# Patient Record
Sex: FEMALE | Race: WHITE | NOT HISPANIC OR LATINO | Employment: FULL TIME | URBAN - METROPOLITAN AREA
[De-identification: names, ages, dates, MRNs, and addresses within clinical notes are randomized per-mention and may not be internally consistent; named-entity substitution may affect disease eponyms.]

---

## 2017-01-04 ENCOUNTER — ALLSCRIPTS OFFICE VISIT (OUTPATIENT)
Dept: OTHER | Facility: OTHER | Age: 47
End: 2017-01-04

## 2017-01-04 DIAGNOSIS — R21 RASH AND OTHER NONSPECIFIC SKIN ERUPTION: ICD-10-CM

## 2017-01-04 DIAGNOSIS — R53.83 OTHER FATIGUE: ICD-10-CM

## 2017-01-06 ENCOUNTER — LAB CONVERSION - ENCOUNTER (OUTPATIENT)
Dept: OTHER | Facility: OTHER | Age: 47
End: 2017-01-06

## 2017-01-06 LAB — LYME IGG/IGM AB (HISTORICAL): NORMAL INDEX

## 2018-01-14 VITALS
DIASTOLIC BLOOD PRESSURE: 78 MMHG | RESPIRATION RATE: 16 BRPM | HEART RATE: 70 BPM | HEIGHT: 64 IN | BODY MASS INDEX: 22.09 KG/M2 | SYSTOLIC BLOOD PRESSURE: 124 MMHG | WEIGHT: 129.38 LBS

## 2018-03-22 ENCOUNTER — OFFICE VISIT (OUTPATIENT)
Dept: FAMILY MEDICINE CLINIC | Facility: MEDICAL CENTER | Age: 48
End: 2018-03-22
Payer: COMMERCIAL

## 2018-03-22 VITALS
BODY MASS INDEX: 22.31 KG/M2 | WEIGHT: 130 LBS | DIASTOLIC BLOOD PRESSURE: 70 MMHG | HEART RATE: 80 BPM | RESPIRATION RATE: 14 BRPM | SYSTOLIC BLOOD PRESSURE: 110 MMHG

## 2018-03-22 DIAGNOSIS — R10.11 RIGHT UPPER QUADRANT ABDOMINAL PAIN: ICD-10-CM

## 2018-03-22 DIAGNOSIS — K43.9 HERNIA OF ABDOMINAL WALL: ICD-10-CM

## 2018-03-22 DIAGNOSIS — E78.49 OTHER HYPERLIPIDEMIA: ICD-10-CM

## 2018-03-22 DIAGNOSIS — R10.13 EPIGASTRIC PAIN: Primary | ICD-10-CM

## 2018-03-22 PROCEDURE — 99214 OFFICE O/P EST MOD 30 MIN: CPT | Performed by: FAMILY MEDICINE

## 2018-03-22 RX ORDER — BIOTIN 2500 MCG
CAPSULE ORAL
COMMUNITY

## 2018-03-22 RX ORDER — ACETAMINOPHEN 160 MG
TABLET,DISINTEGRATING ORAL
COMMUNITY

## 2018-03-22 RX ORDER — MULTIVITAMIN
TABLET ORAL
COMMUNITY

## 2018-03-22 RX ORDER — OMEGA-3-ACID ETHYL ESTERS 1 G/1
2 CAPSULE, LIQUID FILLED ORAL DAILY
COMMUNITY

## 2018-03-23 NOTE — PROGRESS NOTES
Callie Brothers has been under a lot of stress with marital problems  Considering divorce  Currently living with children and   Waiting for children to finish school  She had a stomach virus early January  No appetite, epigastric pain, nausea and vomiting  Some diarrhea  Recovered  Two weeks ago she noticed a lump in her inner right thigh  Had been working  out the day before  Red, warm, painful  A week later she got sharpt pain in her epigastric area which radiated to her right shoulder  Felt like an electric pain  She also has epigastric burnign pain  Occurred several time s Now she has  stomach burning   She drinks coffee and eats chocolate  Drinks about 3 cups of coffee daily  Drinks on an empty stomach  No much alcohol  No chest or neck radiation  Gets occ burning in her throat  Occ wakes form sleep  Ranitidine helps  She started ranitidine twice daily a few days ago and seems to be helping   She drinks decaff green tea  She has interstitial cystitis  Acting up lately  She notes that she has been doing a lot of sit-ups lately  O: /70   Pulse 80   Resp 14   Wt 59 kg (130 lb)   BMI 22 31 kg/m²   Neck no adenopathy thyromegaly  Chest clear  Cardiac regular rate without murmur  Abdomen slight epigastric tenderness  Some slight to moderate right upper quadrant tenderness  Nondistended  No masses or hepatosplenomegaly  She does have a umbilical hernia  Approximately half-dollar sized  In her right inguinal crease pea-sized movable mass nontender  No erythema  Assessment  1  Abdominal pain-most likely reflux esophagitis however need to consider gallbladder disease in light of the right upper quadrant pain  It is also possible that the exercises aggravated her abdominal hernia  Since she has already started ranitidine will have her continue this twice daily  Also advised to avoid caffeine and chocolate  Should elevate the head of the bed  If no better should be referred for EGD    2  Umbilical hernia-should see surgeon  3  History of hyperlipidemia-due for BW   4  Stress-discussed  Encouraged therapist  5  Status post ingrown hair follicle-resolving    Plan  Check abdominal ultrasound  Blood work  Referral to surgery  Otherwise as above  Recheck 4 weeks

## 2018-03-24 ENCOUNTER — APPOINTMENT (OUTPATIENT)
Dept: LAB | Facility: CLINIC | Age: 48
End: 2018-03-24
Payer: COMMERCIAL

## 2018-03-24 DIAGNOSIS — R10.11 RIGHT UPPER QUADRANT ABDOMINAL PAIN: ICD-10-CM

## 2018-03-24 DIAGNOSIS — R10.13 EPIGASTRIC PAIN: ICD-10-CM

## 2018-03-24 DIAGNOSIS — E78.49 OTHER HYPERLIPIDEMIA: ICD-10-CM

## 2018-03-24 LAB
25(OH)D3 SERPL-MCNC: 64.7 NG/ML (ref 30–100)
ALBUMIN SERPL BCP-MCNC: 4.1 G/DL (ref 3.5–5)
ALP SERPL-CCNC: 50 U/L (ref 46–116)
ALT SERPL W P-5'-P-CCNC: 25 U/L (ref 12–78)
ANION GAP SERPL CALCULATED.3IONS-SCNC: 5 MMOL/L (ref 4–13)
AST SERPL W P-5'-P-CCNC: 15 U/L (ref 5–45)
BILIRUB SERPL-MCNC: 0.86 MG/DL (ref 0.2–1)
BUN SERPL-MCNC: 17 MG/DL (ref 5–25)
CALCIUM SERPL-MCNC: 9.5 MG/DL (ref 8.3–10.1)
CHLORIDE SERPL-SCNC: 106 MMOL/L (ref 100–108)
CHOLEST SERPL-MCNC: 187 MG/DL (ref 50–200)
CO2 SERPL-SCNC: 30 MMOL/L (ref 21–32)
CREAT SERPL-MCNC: 0.78 MG/DL (ref 0.6–1.3)
ERYTHROCYTE [DISTWIDTH] IN BLOOD BY AUTOMATED COUNT: 13.4 % (ref 11.6–15.1)
GFR SERPL CREATININE-BSD FRML MDRD: 90 ML/MIN/1.73SQ M
GLUCOSE P FAST SERPL-MCNC: 81 MG/DL (ref 65–99)
HCT VFR BLD AUTO: 43.2 % (ref 34.8–46.1)
HDLC SERPL-MCNC: 69 MG/DL (ref 40–60)
HGB BLD-MCNC: 14.2 G/DL (ref 11.5–15.4)
LDLC SERPL CALC-MCNC: 108 MG/DL (ref 0–100)
MCH RBC QN AUTO: 31.8 PG (ref 26.8–34.3)
MCHC RBC AUTO-ENTMCNC: 32.9 G/DL (ref 31.4–37.4)
MCV RBC AUTO: 97 FL (ref 82–98)
PLATELET # BLD AUTO: 238 THOUSANDS/UL (ref 149–390)
PMV BLD AUTO: 11.2 FL (ref 8.9–12.7)
POTASSIUM SERPL-SCNC: 5.2 MMOL/L (ref 3.5–5.3)
PROT SERPL-MCNC: 7.8 G/DL (ref 6.4–8.2)
RBC # BLD AUTO: 4.47 MILLION/UL (ref 3.81–5.12)
SODIUM SERPL-SCNC: 141 MMOL/L (ref 136–145)
TRIGL SERPL-MCNC: 52 MG/DL
TSH SERPL DL<=0.05 MIU/L-ACNC: 2 UIU/ML (ref 0.36–3.74)
WBC # BLD AUTO: 5.03 THOUSAND/UL (ref 4.31–10.16)

## 2018-03-24 PROCEDURE — 80061 LIPID PANEL: CPT

## 2018-03-24 PROCEDURE — 36415 COLL VENOUS BLD VENIPUNCTURE: CPT

## 2018-03-24 PROCEDURE — 82306 VITAMIN D 25 HYDROXY: CPT

## 2018-03-24 PROCEDURE — 80053 COMPREHEN METABOLIC PANEL: CPT

## 2018-03-24 PROCEDURE — 84443 ASSAY THYROID STIM HORMONE: CPT

## 2018-03-24 PROCEDURE — 85027 COMPLETE CBC AUTOMATED: CPT

## 2018-03-28 ENCOUNTER — HOSPITAL ENCOUNTER (OUTPATIENT)
Dept: ULTRASOUND IMAGING | Facility: HOSPITAL | Age: 48
Discharge: HOME | End: 2018-03-28
Payer: COMMERCIAL

## 2018-03-28 DIAGNOSIS — K43.9 HERNIA OF ABDOMINAL WALL: ICD-10-CM

## 2018-03-28 DIAGNOSIS — R10.11 RIGHT UPPER QUADRANT ABDOMINAL PAIN: ICD-10-CM

## 2018-03-28 DIAGNOSIS — R10.13 EPIGASTRIC PAIN: ICD-10-CM

## 2018-03-28 PROCEDURE — 76700 US EXAM ABDOM COMPLETE: CPT

## 2018-04-09 ENCOUNTER — OFFICE VISIT (OUTPATIENT)
Dept: SURGERY | Facility: CLINIC | Age: 48
End: 2018-04-09
Payer: COMMERCIAL

## 2018-04-09 VITALS
WEIGHT: 126.02 LBS | HEIGHT: 64 IN | HEART RATE: 82 BPM | DIASTOLIC BLOOD PRESSURE: 68 MMHG | BODY MASS INDEX: 21.51 KG/M2 | SYSTOLIC BLOOD PRESSURE: 108 MMHG | TEMPERATURE: 98.7 F | RESPIRATION RATE: 18 BRPM

## 2018-04-09 DIAGNOSIS — K42.9 UMBILICAL HERNIA WITHOUT OBSTRUCTION AND WITHOUT GANGRENE: Primary | ICD-10-CM

## 2018-04-09 DIAGNOSIS — K43.9 HERNIA OF ABDOMINAL WALL: ICD-10-CM

## 2018-04-09 PROBLEM — K21.9 GASTROESOPHAGEAL REFLUX DISEASE WITHOUT ESOPHAGITIS: Status: ACTIVE | Noted: 2018-04-09

## 2018-04-09 PROCEDURE — 99204 OFFICE O/P NEW MOD 45 MIN: CPT | Performed by: SURGERY

## 2018-04-09 NOTE — PROGRESS NOTES
GENERAL SURGERY HISTORY AND PHYSICAL     Leana Fitzpatrick 49 y o  female MRN: [id number]  Unit/Bed#:  Encounter: [id number]      Assessment/Plan   1  Hernia of abdominal wall  Ambulatory referral to General Surgery     Patient with history consistent with reducible umbilical hernia  Patient had reduced hernia on her own prior to my exam so I was not able to fully evaluate the area of of the defect or its contents  Given the patient's reported ease of reduction and lack of obstructive symptoms I do not feel hernia requires emergent repair  Given its symptomatic nature would recommend patient undergo repair on an elective basis  Discussed with her that I would recommend a mesh repair given her level of activity and young age  Patient has a very active schedule the next several months and is not able to go forward with the surgery in the immediate future  Discussed with the patient's symptoms of obstruction to be aware of including on reducible mass persistent pain  Nausea, vomiting, distention and obstipation  Should she have such symptoms should present for immediate evaluation  Will have patient follow up in the future when her schedule is more accommodating to a surgical procedure  Patient may also follow up as needed for any further concerns  Chief Complaint umbilical pain bulging    HPI: Leana Fitzpatrick is a 49y o  year old female who presents with umbilical pain and bulging  Patient has had bulge at this location since her 1st pregnancy I 18 years ago  Also exacerbated after 2nd pregnancy  States she will have a palpable mass that she is easily able to reduce  Becomes more prominent painful throughout the day  Has been more tender recently due to increased abdominal activity  Patient also has issues with reflux but has been improving on ranatadine  Denies any obstructive symptoms, no nausea, vomiting, distension, or obstipation  Again always able to easily reduce the mass    Patient has no previous surgical history  Patient denies any chest pain, shortness of breath, or exertional dyspnea/angina  Able to climb a flight of stairs with no difficulty  No other acute issues        ROS:  12 Point ROS reviewed and negative except for:  Reflux, umbilical mass tenderness    Historical Information   No past medical history on file  Past Surgical History:   Procedure Laterality Date    COLONOSCOPY      approx 2011 (fiberoptic)     Social History   History   Alcohol Use    Yes     Comment: social     History   Drug use: Unknown     History   Smoking Status    Never Smoker   Smokeless Tobacco    Never Used     Family History: no pertinent family history  Allergies   Allergen Reactions    Nitrofurantoin     Sulfa Antibiotics      Meds/Allergies   all current active meds have been reviewed      Objective   Vitals: Blood pressure 108/68, pulse 82, temperature 98 7 °F (37 1 °C), temperature source Oral, resp  rate 18, height 5' 4" (1 626 m), weight 57 2 kg (126 lb 0 3 oz), not currently breastfeeding  ,Body mass index is 21 63 kg/m²  Physical Exam:    General appearance: Awake alert oriented no acute distress  HEENT: Sclera clear, anicterus, no discharge  Neck: Trachea is midline, no adenopathy  Lungs: No respiratory distress, clear to auscultation bilaterally  Heart[de-identified] RRR  Abdomen: soft, nondistended, minimal tender palpation supraumbilically  Patient had just reduced hernia prior to my evaluation I do not appreciate a discrete mass or a defect I can palpate  No evidence of diastasis on flexion of the trunk    Extremities: No edema, nontender  Skin:No rashes or lesions  Neurologic: No weakness or loss of sensation  Psych: Affect appropriate    Lonnie Desir MD  4/9/2018

## 2018-05-24 ENCOUNTER — OFFICE VISIT (OUTPATIENT)
Dept: FAMILY MEDICINE CLINIC | Facility: MEDICAL CENTER | Age: 48
End: 2018-05-24
Payer: COMMERCIAL

## 2018-05-24 VITALS
HEIGHT: 64 IN | DIASTOLIC BLOOD PRESSURE: 70 MMHG | RESPIRATION RATE: 14 BRPM | BODY MASS INDEX: 21.85 KG/M2 | HEART RATE: 80 BPM | WEIGHT: 128 LBS | SYSTOLIC BLOOD PRESSURE: 112 MMHG

## 2018-05-24 DIAGNOSIS — K76.9 LESION OF RIGHT LOBE OF LIVER: Primary | ICD-10-CM

## 2018-05-24 DIAGNOSIS — K21.9 GASTROESOPHAGEAL REFLUX DISEASE WITHOUT ESOPHAGITIS: ICD-10-CM

## 2018-05-24 DIAGNOSIS — K42.9 UMBILICAL HERNIA WITHOUT OBSTRUCTION AND WITHOUT GANGRENE: ICD-10-CM

## 2018-05-24 PROCEDURE — 99213 OFFICE O/P EST LOW 20 MIN: CPT | Performed by: FAMILY MEDICINE

## 2018-05-24 PROCEDURE — 3008F BODY MASS INDEX DOCD: CPT | Performed by: FAMILY MEDICINE

## 2018-05-24 NOTE — PROGRESS NOTES
Hong Silveira is here for f/u  She says her epigastric pain has resolved  Taking 75 mg Zantac  twice daily  She is feeling much better   Eating sauce, etc without symptoms  She saw surgeon for her umbilical hernia  See consult Dr Houston Akira  Will be having elective surgery  She is taking Vit D deficiency 6207-7149 units daily  Stress is better  Getting along with   Enjoys her job  Has 2 teen age sons; Yaay Perry  O: /70   Pulse 80   Resp 14   Ht 5' 4" (1 626 m)   Wt 58 1 kg (128 lb)   BMI 21 97 kg/m²    Abd US  Normal except 7  mm probable hemangioma   CBC CMP, LP TSH Vit D normal    Assessment  1  Esophageal reflux-much improved with continued use of Zantac  She will try to taper and discontinue and use only as needed  2  Umbilical hernia-will be going for elective surgery  3  Liver lesion-probable hemangioma  Will get a six-month follow-up ultrasound  Plan  As above  Liver ultrasound 6 months

## 2019-07-31 ENCOUNTER — OFFICE VISIT (OUTPATIENT)
Dept: FAMILY MEDICINE CLINIC | Facility: MEDICAL CENTER | Age: 49
End: 2019-07-31
Payer: COMMERCIAL

## 2019-07-31 VITALS
HEIGHT: 65 IN | BODY MASS INDEX: 22.16 KG/M2 | WEIGHT: 133 LBS | HEART RATE: 71 BPM | DIASTOLIC BLOOD PRESSURE: 78 MMHG | SYSTOLIC BLOOD PRESSURE: 120 MMHG

## 2019-07-31 DIAGNOSIS — Z12.11 COLON CANCER SCREENING: ICD-10-CM

## 2019-07-31 DIAGNOSIS — Z12.11 SCREENING FOR COLON CANCER: ICD-10-CM

## 2019-07-31 DIAGNOSIS — Z13.820 SCREENING FOR OSTEOPOROSIS: ICD-10-CM

## 2019-07-31 DIAGNOSIS — Z00.00 ROUTINE ADULT HEALTH MAINTENANCE: ICD-10-CM

## 2019-07-31 DIAGNOSIS — Z23 NEED FOR SHINGLES VACCINE: Primary | ICD-10-CM

## 2019-07-31 PROCEDURE — 99396 PREV VISIT EST AGE 40-64: CPT | Performed by: FAMILY MEDICINE

## 2019-07-31 NOTE — PROGRESS NOTES
Juan Roberts is here for CPX  Medical history unchanged  SH: Works at [employer]  Lives with 2 sons and   HH: goes to the gym at work  FH:  Breast cancer in mother and maternal grandmother  Coronary artery disease in father  Diabetes none  She injured her left hip and knee playing volleyball   Went to  chiropractor and much better  Ran a 5 K  Diet Eats a lot of fruits and vegetables  Trying  to eat healthy  Eats nuts, whole grains  Dietary calcium 2 daily  Caffeine coffee 2 daily  Alcohol socially  Nonsmoker  Gyn 2018  Mammogram 2019  Menopause started age 35; LMP 3-4 years ago  Colonoscopy 5 years ago? 5 year f/u  For polyp     Review of Systems   Constitutional: Negative for unexpected weight change  Respiratory: Negative for cough and shortness of breath  Cardiovascular: Negative for chest pain, palpitations and leg swelling  Gastrointestinal: Negative for abdominal pain and blood in stool  Musculoskeletal: Negative for arthralgias  Skin: Negative for rash  Neurological: Negative for dizziness and syncope  O: /78 (BP Location: Left arm, Patient Position: Sitting, Cuff Size: Adult)   Pulse 71   Ht 5' 5" (1 651 m)   Wt 60 3 kg (133 lb)   BMI 22 13 kg/m²   Physical Exam   Constitutional: She is oriented to person, place, and time  She appears well-developed and well-nourished  HENT:   Head: Normocephalic  Right Ear: External ear normal    Left Ear: External ear normal    Nose: Nose normal    Mouth/Throat: Oropharynx is clear and moist    Eyes: Pupils are equal, round, and reactive to light  Conjunctivae and EOM are normal  No scleral icterus  Neck: Normal range of motion  Neck supple  Carotid bruit is not present  No thyroid mass and no thyromegaly present  Cardiovascular: Normal rate, regular rhythm, normal heart sounds and intact distal pulses  Pulses:       Femoral pulses are 2+ on the right side, and 2+ on the left side  Popliteal pulses are 2+ on the right side, and 2+ on the left side  Dorsalis pedis pulses are 2+ on the right side, and 2+ on the left side  Posterior tibial pulses are 2+ on the right side, and 2+ on the left side  Pulmonary/Chest: Effort normal and breath sounds normal  No respiratory distress  She has no wheezes  She has no rales  Abdominal: Soft  Normal aorta and bowel sounds are normal  She exhibits no distension and no mass  There is no hepatosplenomegaly  There is no tenderness  Musculoskeletal: Normal range of motion  She exhibits no edema  Lymphadenopathy:        Head (right side): No submandibular and no occipital adenopathy present  Head (left side): No submandibular and no occipital adenopathy present  She has no cervical adenopathy  Right: No inguinal and no supraclavicular adenopathy present  Left: No inguinal and no supraclavicular adenopathy present  Neurological: She is oriented to person, place, and time  Skin: No lesion and no rash noted  Psychiatric: She has a normal mood and affect  Her behavior is normal      Assessment  1  Health maintenance-immunizations are up up-to-date although likely needs a tetanus booster which she declines  DiscussedShingrix  Blood work done last year was good  Declines this year  2  History of colon polyps-did advise her to go back for follow-up 5 year colonoscopy  3  Postmenopausal female-reviewed breast screening guidelines and osteolysis prevention  Did discuss genetic counseling in light of family history for breast cancer which she declines  Reviewed calcium supplementation weight-bearing exercise and avoidance of bonerobbers  Will get a DEXA scan    Plan  DEXA scan  Colonoscopy referral Shingrix

## 2019-10-30 ENCOUNTER — TELEPHONE (OUTPATIENT)
Dept: FAMILY MEDICINE CLINIC | Facility: MEDICAL CENTER | Age: 49
End: 2019-10-30

## 2019-10-30 NOTE — PROGRESS NOTES
Chart updated per office request  Discrete reportable data documented      *Updated Paps, DOS  9-5-18  3-4-16

## 2019-10-30 NOTE — TELEPHONE ENCOUNTER
Please call  Last visit Arianna Natarajan reported she had a colonoscopy in her late 41's  No doctor was mentioned  Please ask who did the procedure so we can obtain the report

## 2019-10-30 NOTE — TELEPHONE ENCOUNTER
Pt aware, but can not remember where she had it done, she will work on finding that out and call us back, but she thinks it may have been [facility], I called them and they said pt cancelled her appt back in 2011, I also called Dr Desire Ervin office because she thought that sounded familiar also, but it wasn't there either

## 2019-12-11 ENCOUNTER — TELEPHONE (OUTPATIENT)
Dept: GASTROENTEROLOGY | Facility: CLINIC | Age: 49
End: 2019-12-11

## 2019-12-17 DIAGNOSIS — Z12.11 COLON CANCER SCREENING: Primary | ICD-10-CM

## 2019-12-17 NOTE — TELEPHONE ENCOUNTER
Colonoscopy scheduled with Dr Sow at [facility] on 1/13/20  I gave patient verbal instructions/mailed  Prep Suprep

## 2019-12-30 ENCOUNTER — ANESTHESIA EVENT (OUTPATIENT)
Dept: GASTROENTEROLOGY | Facility: AMBULARY SURGERY CENTER | Age: 49
End: 2019-12-30

## 2020-01-10 NOTE — TELEPHONE ENCOUNTER
Called and spoke to pt to confirm procedure - she had questions regarding when to take prep  I answered her questions and she demonstrated understanding

## 2020-01-13 ENCOUNTER — ANESTHESIA (OUTPATIENT)
Dept: GASTROENTEROLOGY | Facility: AMBULARY SURGERY CENTER | Age: 50
End: 2020-01-13

## 2020-01-13 ENCOUNTER — HOSPITAL ENCOUNTER (OUTPATIENT)
Dept: GASTROENTEROLOGY | Facility: AMBULARY SURGERY CENTER | Age: 50
Setting detail: OUTPATIENT SURGERY
Discharge: HOME | End: 2020-01-13
Attending: INTERNAL MEDICINE | Admitting: INTERNAL MEDICINE
Payer: COMMERCIAL

## 2020-01-13 VITALS
WEIGHT: 130 LBS | HEIGHT: 65 IN | DIASTOLIC BLOOD PRESSURE: 76 MMHG | HEART RATE: 57 BPM | OXYGEN SATURATION: 100 % | TEMPERATURE: 97.2 F | SYSTOLIC BLOOD PRESSURE: 121 MMHG | RESPIRATION RATE: 18 BRPM | BODY MASS INDEX: 21.66 KG/M2

## 2020-01-13 DIAGNOSIS — Z12.11 COLON CANCER SCREENING: ICD-10-CM

## 2020-01-13 PROCEDURE — 45380 COLONOSCOPY AND BIOPSY: CPT | Performed by: INTERNAL MEDICINE

## 2020-01-13 PROCEDURE — 88305 TISSUE EXAM BY PATHOLOGIST: CPT | Performed by: PATHOLOGY

## 2020-01-13 RX ORDER — SODIUM CHLORIDE, SODIUM LACTATE, POTASSIUM CHLORIDE, CALCIUM CHLORIDE 600; 310; 30; 20 MG/100ML; MG/100ML; MG/100ML; MG/100ML
100 INJECTION, SOLUTION INTRAVENOUS CONTINUOUS
Status: DISCONTINUED | OUTPATIENT
Start: 2020-01-13 | End: 2020-01-17 | Stop reason: HOSPADM

## 2020-01-13 RX ORDER — PROPOFOL 10 MG/ML
INJECTION, EMULSION INTRAVENOUS AS NEEDED
Status: DISCONTINUED | OUTPATIENT
Start: 2020-01-13 | End: 2020-01-13 | Stop reason: SURG

## 2020-01-13 RX ADMIN — PROPOFOL 90 MG: 10 INJECTION, EMULSION INTRAVENOUS at 11:21

## 2020-01-13 RX ADMIN — PROPOFOL 40 MG: 10 INJECTION, EMULSION INTRAVENOUS at 11:27

## 2020-01-13 RX ADMIN — SODIUM CHLORIDE, SODIUM LACTATE, POTASSIUM CHLORIDE, AND CALCIUM CHLORIDE: .6; .31; .03; .02 INJECTION, SOLUTION INTRAVENOUS at 11:17

## 2020-01-13 RX ADMIN — PROPOFOL 40 MG: 10 INJECTION, EMULSION INTRAVENOUS at 11:23

## 2020-01-13 RX ADMIN — PROPOFOL 30 MG: 10 INJECTION, EMULSION INTRAVENOUS at 11:29

## 2020-01-13 RX ADMIN — PROPOFOL 40 MG: 10 INJECTION, EMULSION INTRAVENOUS at 11:25

## 2020-01-13 RX ADMIN — PROPOFOL 30 MG: 10 INJECTION, EMULSION INTRAVENOUS at 11:32

## 2020-01-13 NOTE — ANESTHESIA PREPROCEDURE EVALUATION
Review of Systems/Medical History  Patient summary reviewed  Chart reviewed  No history of anesthetic complications     Cardiovascular  Negative cardio ROS Exercise tolerance (METS): >4,     Pulmonary  Negative pulmonary ROS        GI/Hepatic    GERD , Bowel prep       Negative  ROS        Endo/Other  Negative endo/other ROS      GYN  Negative gynecology ROS          Hematology  Negative hematology ROS      Musculoskeletal  Negative musculoskeletal ROS        Neurology  Negative neurology ROS      Psychology   Negative psychology ROS              Physical Exam    Airway    Mallampati score: II  TM Distance: >3 FB  Neck ROM: full     Dental   Comment: Right upper incisor crown; secure,     Cardiovascular  Comment: Negative ROS,     Pulmonary      Other Findings        Anesthesia Plan  ASA Score- 1     Anesthesia Type- IV sedation with anesthesia with ASA Monitors  Additional Monitors:   Airway Plan:         Plan Factors-    Induction- intravenous  Postoperative Plan-     Informed Consent- Anesthetic plan and risks discussed with patient  I personally reviewed this patient with the CRNA  Discussed and agreed on the Anesthesia Plan with the CRNA  Doug Barber

## 2020-01-13 NOTE — ANESTHESIA POSTPROCEDURE EVALUATION
Post-Op Assessment Note    CV Status:  Stable  Pain Score: 0    Pain management: adequate     Mental Status:  Alert and awake   Hydration Status:  Euvolemic   PONV Controlled:  Controlled   Airway Patency:  Patent   Post Op Vitals Reviewed: Yes      Staff: Anesthesiologist, CRNA           /61 (01/13/20 1148)    Temp (!) 97 2 °F (36 2 °C) (01/13/20 1148)    Pulse 61 (01/13/20 1148)   Resp 16 (01/13/20 1148)    SpO2 100 % (01/13/20 1148)

## 2020-01-13 NOTE — H&P
History and Physical -  [facility]  Lawrence Aponte 47 y o  female MRN: [id number]                  HPI: Lawrence Aponte is a 47y o  year old female who presents for colon cancer screening  REVIEW OF SYSTEMS: Per the HPI, and otherwise unremarkable  Historical Information   Past Medical History:   Diagnosis Date    Colon polyp      Past Surgical History:   Procedure Laterality Date    COLONOSCOPY      approx 2011 (fiberoptic)     Social History   Social History     Substance and Sexual Activity   Alcohol Use Yes    Frequency: 2-4 times a month    Drinks per session: 1 or 2    Comment: social     Social History     Substance and Sexual Activity   Drug Use Not on file     Social History     Tobacco Use   Smoking Status Never Smoker   Smokeless Tobacco Never Used     Family History   Problem Relation Age of Onset    Arthritis Mother     Breast cancer Mother     Heart disease Father     Hypertension Father        Meds/Allergies       (Not in a hospital admission)    Allergies   Allergen Reactions    Nitrofurantoin     Sulfa Antibiotics        Objective     There were no vitals taken for this visit  PHYSICAL EXAM    Gen: NAD  CV: RRR  CHEST: Clear  ABD: soft, NT/ND  EXT: no edema      ASSESSMENT/PLAN:  This is a 47y o  year old female here for colonoscopy, and she is stable and optimized for her procedure

## 2020-08-13 ENCOUNTER — OFFICE VISIT (OUTPATIENT)
Dept: FAMILY MEDICINE CLINIC | Facility: MEDICAL CENTER | Age: 50
End: 2020-08-13
Payer: COMMERCIAL

## 2020-08-13 VITALS
DIASTOLIC BLOOD PRESSURE: 80 MMHG | WEIGHT: 131 LBS | TEMPERATURE: 97.4 F | SYSTOLIC BLOOD PRESSURE: 126 MMHG | BODY MASS INDEX: 21.83 KG/M2 | HEART RATE: 76 BPM | HEIGHT: 65 IN | RESPIRATION RATE: 16 BRPM

## 2020-08-13 DIAGNOSIS — Z23 NEED FOR DIPHTHERIA-TETANUS-PERTUSSIS (TDAP) VACCINE: ICD-10-CM

## 2020-08-13 DIAGNOSIS — Z78.0 POST-MENOPAUSAL: ICD-10-CM

## 2020-08-13 DIAGNOSIS — D18.00 HEMANGIOMA, UNSPECIFIED SITE: Primary | ICD-10-CM

## 2020-08-13 DIAGNOSIS — J30.9 ALLERGIC RHINITIS, UNSPECIFIED SEASONALITY, UNSPECIFIED TRIGGER: ICD-10-CM

## 2020-08-13 DIAGNOSIS — Z00.00 ROUTINE ADULT HEALTH MAINTENANCE: ICD-10-CM

## 2020-08-13 PROCEDURE — 90715 TDAP VACCINE 7 YRS/> IM: CPT | Performed by: FAMILY MEDICINE

## 2020-08-13 PROCEDURE — 90471 IMMUNIZATION ADMIN: CPT | Performed by: FAMILY MEDICINE

## 2020-08-13 PROCEDURE — 99396 PREV VISIT EST AGE 40-64: CPT | Performed by: FAMILY MEDICINE

## 2020-08-13 PROCEDURE — 3008F BODY MASS INDEX DOCD: CPT | Performed by: FAMILY MEDICINE

## 2020-08-13 NOTE — PROGRESS NOTES
Luh Springer is here for CPX  She has  been doing well  HH:Exericses with biking, kayaking, running, volleyball  Diet is healthy with Mediterranean diet  Eats spinach and kale  Dietary calcium several daily  Caffeine 1 daily  Alcohol 1 red wine daily  FH: Breast cancer  in mother and GM  CAD in father  No osteoporosis  SH:   Mammogram 2019  Gyn checkup 2018  Colonoscopy 2020  10 year f/u  Eye checkup 2019    She saw a surgery for her abdominal hernia  Who did not feel hernia required emergent repair  Has decided not to pursue it  She  says her allergies have been bad  Thinks it is related to dust and pines  Has been bad while she  has been relocating  Took Claritin  Still struggling with marital problems  Review of Systems   Constitutional: Negative for fatigue and fever  Respiratory: Negative for chest tightness, shortness of breath and wheezing  Cardiovascular: Negative for chest pain, palpitations and leg swelling  O: /80   Pulse 76   Temp (!) 97 4 °F (36 3 °C)   Resp 16   Ht 5' 5" (1 651 m)   Wt 59 4 kg (131 lb)   BMI 21 80 kg/m²   Physical Exam  Constitutional:       Appearance: She is well-developed  HENT:      Head: Normocephalic  Right Ear: Tympanic membrane and external ear normal       Left Ear: Tympanic membrane and external ear normal       Nose: Nose normal    Eyes:      General: No scleral icterus  Conjunctiva/sclera: Conjunctivae normal       Pupils: Pupils are equal, round, and reactive to light  Neck:      Musculoskeletal: Normal range of motion and neck supple  Thyroid: No thyroid mass or thyromegaly  Vascular: No carotid bruit  Cardiovascular:      Rate and Rhythm: Normal rate and regular rhythm  Pulses:           Femoral pulses are 2+ on the right side and 2+ on the left side  Popliteal pulses are 2+ on the right side and 2+ on the left side          Dorsalis pedis pulses are 2+ on the right side and 2+ on the left side  Posterior tibial pulses are 2+ on the right side and 2+ on the left side  Heart sounds: Normal heart sounds  Pulmonary:      Effort: Pulmonary effort is normal  No respiratory distress  Breath sounds: Normal breath sounds  No wheezing or rales  Abdominal:      General: Bowel sounds are normal  There is no distension  Palpations: Abdomen is soft  There is no mass  Tenderness: There is no abdominal tenderness  Musculoskeletal: Normal range of motion  Lymphadenopathy:      Head:      Right side of head: No submandibular or occipital adenopathy  Left side of head: No submandibular or occipital adenopathy  Cervical: No cervical adenopathy  Upper Body:      Right upper body: No supraclavicular adenopathy  Left upper body: No supraclavicular adenopathy  Skin:     Findings: No lesion or rash  Neurological:      Mental Status: She is oriented to person, place, and time  Psychiatric:         Behavior: Behavior normal        Assessment  1  HM-Colorectal cervical and breast cancer screening is up-to-date  Discussed osteoporosis prevention  Continue vitamin-D and dietary calcium  Should have a DEXA scan  Immunizations updated  Discussed Shingrix  Needs Tdap  2  Hemangioma on liver ultrasound-due for follow-up  3  Allergic rhinitis-perennial-may use Claritin as needed  4  Hernia of abdominal wall-has seen surgery  Wishes to monitor  Plan  Tdap  Check blood work  Right upper quadrant ultrasound  DEXA scan

## 2020-09-11 ENCOUNTER — CLINICAL SUPPORT (OUTPATIENT)
Dept: FAMILY MEDICINE CLINIC | Facility: MEDICAL CENTER | Age: 50
End: 2020-09-11
Payer: COMMERCIAL

## 2020-09-11 DIAGNOSIS — Z23 IMMUNIZATION DUE: Primary | ICD-10-CM

## 2020-09-11 PROCEDURE — 90471 IMMUNIZATION ADMIN: CPT

## 2020-09-11 PROCEDURE — 90750 HZV VACC RECOMBINANT IM: CPT

## 2020-09-12 LAB
ALBUMIN SERPL-MCNC: 4.5 G/DL (ref 3.8–4.9)
ALBUMIN/GLOB SERPL: 1.8 {RATIO} (ref 1.2–2.2)
ALP SERPL-CCNC: 44 IU/L (ref 39–117)
ALT SERPL-CCNC: 15 IU/L (ref 0–32)
AST SERPL-CCNC: 15 IU/L (ref 0–40)
BASOPHILS # BLD AUTO: 0 X10E3/UL (ref 0–0.2)
BASOPHILS NFR BLD AUTO: 1 %
BILIRUB SERPL-MCNC: 0.6 MG/DL (ref 0–1.2)
BUN SERPL-MCNC: 14 MG/DL (ref 6–24)
BUN/CREAT SERPL: 21 (ref 9–23)
CALCIUM SERPL-MCNC: 9.5 MG/DL (ref 8.7–10.2)
CHLORIDE SERPL-SCNC: 102 MMOL/L (ref 96–106)
CHOLEST SERPL-MCNC: 215 MG/DL (ref 100–199)
CO2 SERPL-SCNC: 25 MMOL/L (ref 20–29)
CREAT SERPL-MCNC: 0.67 MG/DL (ref 0.57–1)
EOSINOPHIL # BLD AUTO: 0.1 X10E3/UL (ref 0–0.4)
EOSINOPHIL NFR BLD AUTO: 3 %
ERYTHROCYTE [DISTWIDTH] IN BLOOD BY AUTOMATED COUNT: 11.9 % (ref 11.7–15.4)
GLOBULIN SER-MCNC: 2.5 G/DL (ref 1.5–4.5)
GLUCOSE SERPL-MCNC: 98 MG/DL (ref 65–99)
HCT VFR BLD AUTO: 42.8 % (ref 34–46.6)
HDLC SERPL-MCNC: 79 MG/DL
HGB BLD-MCNC: 14.2 G/DL (ref 11.1–15.9)
IMM GRANULOCYTES # BLD: 0 X10E3/UL (ref 0–0.1)
IMM GRANULOCYTES NFR BLD: 0 %
LDLC SERPL CALC-MCNC: 124 MG/DL (ref 0–99)
LYMPHOCYTES # BLD AUTO: 1.4 X10E3/UL (ref 0.7–3.1)
LYMPHOCYTES NFR BLD AUTO: 28 %
MCH RBC QN AUTO: 32.3 PG (ref 26.6–33)
MCHC RBC AUTO-ENTMCNC: 33.2 G/DL (ref 31.5–35.7)
MCV RBC AUTO: 98 FL (ref 79–97)
MONOCYTES # BLD AUTO: 0.4 X10E3/UL (ref 0.1–0.9)
MONOCYTES NFR BLD AUTO: 7 %
NEUTROPHILS # BLD AUTO: 3.1 X10E3/UL (ref 1.4–7)
NEUTROPHILS NFR BLD AUTO: 61 %
PLATELET # BLD AUTO: 249 X10E3/UL (ref 150–450)
POTASSIUM SERPL-SCNC: 4.5 MMOL/L (ref 3.5–5.2)
PROT SERPL-MCNC: 7 G/DL (ref 6–8.5)
RBC # BLD AUTO: 4.39 X10E6/UL (ref 3.77–5.28)
SL AMB EGFR AFRICAN AMERICAN: 118 ML/MIN/1.73
SL AMB EGFR NON AFRICAN AMERICAN: 102 ML/MIN/1.73
SL AMB VLDL CHOLESTEROL CALC: 12 MG/DL (ref 5–40)
SODIUM SERPL-SCNC: 140 MMOL/L (ref 134–144)
TRIGL SERPL-MCNC: 67 MG/DL (ref 0–149)
TSH SERPL DL<=0.005 MIU/L-ACNC: 1.75 UIU/ML (ref 0.45–4.5)
WBC # BLD AUTO: 5.1 X10E3/UL (ref 3.4–10.8)

## 2020-09-14 NOTE — RESULT ENCOUNTER NOTE
I called Levar Carlisle and let her know that her COVID-19 swab was negative  I advised her to continue social distancing procedures

## 2020-09-15 DIAGNOSIS — Z78.0 POST-MENOPAUSAL: ICD-10-CM

## 2020-09-23 ENCOUNTER — IMMUNIZATIONS (OUTPATIENT)
Dept: FAMILY MEDICINE CLINIC | Facility: MEDICAL CENTER | Age: 50
End: 2020-09-23
Payer: COMMERCIAL

## 2020-09-23 DIAGNOSIS — Z23 ENCOUNTER FOR IMMUNIZATION: ICD-10-CM

## 2020-09-23 PROCEDURE — 90682 RIV4 VACC RECOMBINANT DNA IM: CPT

## 2020-09-23 PROCEDURE — 90471 IMMUNIZATION ADMIN: CPT

## 2020-09-28 ENCOUNTER — TELEPHONE (OUTPATIENT)
Dept: FAMILY MEDICINE CLINIC | Facility: MEDICAL CENTER | Age: 50
End: 2020-09-28

## 2020-09-28 NOTE — TELEPHONE ENCOUNTER
----- Message from Dell Atkinson MD sent at 9/27/2020  9:24 PM EDT -----  Notify DEXA scan is normal   Continue weight-bearing exercise and avoid bone robbers such as caffeine and soda  Repeat 2 years

## 2020-11-04 ENCOUNTER — OFFICE VISIT (OUTPATIENT)
Dept: FAMILY MEDICINE CLINIC | Facility: MEDICAL CENTER | Age: 50
End: 2020-11-04
Payer: COMMERCIAL

## 2020-11-04 VITALS
BODY MASS INDEX: 22.59 KG/M2 | HEIGHT: 65 IN | WEIGHT: 135.6 LBS | SYSTOLIC BLOOD PRESSURE: 112 MMHG | TEMPERATURE: 97.1 F | DIASTOLIC BLOOD PRESSURE: 76 MMHG | HEART RATE: 58 BPM

## 2020-11-04 DIAGNOSIS — L02.91 ABSCESS: Primary | ICD-10-CM

## 2020-11-04 PROCEDURE — 3725F SCREEN DEPRESSION PERFORMED: CPT | Performed by: FAMILY MEDICINE

## 2020-11-04 PROCEDURE — 3008F BODY MASS INDEX DOCD: CPT | Performed by: FAMILY MEDICINE

## 2020-11-04 PROCEDURE — 99213 OFFICE O/P EST LOW 20 MIN: CPT | Performed by: FAMILY MEDICINE

## 2021-04-08 DIAGNOSIS — Z23 ENCOUNTER FOR IMMUNIZATION: ICD-10-CM

## 2021-08-19 ENCOUNTER — OFFICE VISIT (OUTPATIENT)
Dept: FAMILY MEDICINE CLINIC | Facility: MEDICAL CENTER | Age: 51
End: 2021-08-19
Payer: COMMERCIAL

## 2021-08-19 VITALS
OXYGEN SATURATION: 97 % | BODY MASS INDEX: 21.19 KG/M2 | HEART RATE: 63 BPM | RESPIRATION RATE: 16 BRPM | WEIGHT: 127.2 LBS | DIASTOLIC BLOOD PRESSURE: 80 MMHG | HEIGHT: 65 IN | SYSTOLIC BLOOD PRESSURE: 122 MMHG | TEMPERATURE: 98.3 F

## 2021-08-19 DIAGNOSIS — M79.645 CHRONIC PAIN OF LEFT THUMB: Primary | ICD-10-CM

## 2021-08-19 DIAGNOSIS — G89.29 CHRONIC PAIN OF LEFT THUMB: Primary | ICD-10-CM

## 2021-08-19 DIAGNOSIS — L98.9 SKIN LESIONS: ICD-10-CM

## 2021-08-19 DIAGNOSIS — K21.9 GASTROESOPHAGEAL REFLUX DISEASE WITHOUT ESOPHAGITIS: ICD-10-CM

## 2021-08-19 DIAGNOSIS — Z00.00 ROUTINE ADULT HEALTH MAINTENANCE: ICD-10-CM

## 2021-08-19 PROCEDURE — 99396 PREV VISIT EST AGE 40-64: CPT | Performed by: FAMILY MEDICINE

## 2021-08-19 PROCEDURE — 3008F BODY MASS INDEX DOCD: CPT | Performed by: FAMILY MEDICINE

## 2021-08-19 PROCEDURE — 3725F SCREEN DEPRESSION PERFORMED: CPT | Performed by: FAMILY MEDICINE

## 2021-08-19 NOTE — PROGRESS NOTES
Donal Perez is here for CPX  SH: Working  as  for [id number] from ; plans on moving to [facility] with her cousin  HH: Still very active with walking, biking, hiking  Drinks decaff green tea  Alcohol 1 red wine daily  Colonoscopy 2020  10 year f/u Dr Caty Mclaughlin scan 2020  Mammogram 2021  Gyn exam 2020      She has had some reflux  Gets throat burning and cough  Moslty at night  Wakes from sleep  Occurs about 3 time s  a week  Gets with red wine, spicy foods, chocolate  She took famotidine occ which helped  she had a EGD in 2011 which showed no ulcers or bleeding  Small gastric polyp with biopsy which was apparently benign  However she has not really had a problem with reflux significantly since that time  See previous report on chart  She had a colonoscopy done last year  Dr Sam Hi  She  also has  allergies  Suspect dog  She asks about a mole on her back  Seh asks about her left thumb  Had a lot of repetitive movement in her left thumb  Hurts at base of base of wrist  Started about 6 months ago  She asks about a purple bump on her anus  Got bigger but not painful  She has pain on her feet both sides laterally   Got worse after hiking     Review of Systems   Constitutional: Negative for chills and fever  Respiratory: Negative for cough  Cardiovascular: Negative for chest pain and palpitations  Gastrointestinal: Negative for blood in stool  O: /80 (Cuff Size: Standard)   Pulse 63   Temp 98 3 °F (36 8 °C)   Resp 16   Ht 5' 4 5" (1 638 m)   Wt 57 7 kg (127 lb 3 2 oz)   SpO2 97%   BMI 21 50 kg/m²    Physical Exam  Constitutional:       Appearance: She is well-developed  HENT:      Head: Normocephalic  Right Ear: Tympanic membrane and external ear normal       Left Ear: Tympanic membrane and external ear normal       Nose: Nose normal    Eyes:      General: No scleral icterus       Conjunctiva/sclera: Conjunctivae normal       Pupils: Pupils are equal, round, and reactive to light  Neck:      Thyroid: No thyroid mass or thyromegaly  Vascular: No carotid bruit  Cardiovascular:      Rate and Rhythm: Normal rate and regular rhythm  Pulses:           Femoral pulses are 2+ on the right side and 2+ on the left side  Popliteal pulses are 2+ on the right side and 2+ on the left side  Dorsalis pedis pulses are 2+ on the right side and 2+ on the left side  Posterior tibial pulses are 2+ on the right side and 2+ on the left side  Heart sounds: Normal heart sounds  Pulmonary:      Effort: Pulmonary effort is normal  No respiratory distress  Breath sounds: Normal breath sounds  No wheezing or rales  Abdominal:      General: Bowel sounds are normal  There is no distension  Palpations: Abdomen is soft  There is no mass  Tenderness: There is no abdominal tenderness  Genitourinary:     Comments: Nontender BB sized nodular area adjacent to the anus   On the right  Slightly purplish hue  with a varicosity noted just superior to this  Musculoskeletal:         General: Normal range of motion  Cervical back: Normal range of motion and neck supple  Comments: Tenderness is noted over the carpometacarpal joint of the left hand  Negative Tinel sign  Negative Phalen sign  Positive Finkelstein's test    Lymphadenopathy:      Head:      Right side of head: No submandibular or occipital adenopathy  Left side of head: No submandibular or occipital adenopathy  Cervical: No cervical adenopathy  Upper Body:      Right upper body: No supraclavicular adenopathy  Left upper body: No supraclavicular adenopathy  Skin:     Findings: No lesion or rash  Comments: Two seborrheic keratoses are noted on the mid  Right back  Neurological:      Mental Status: She is oriented to person, place, and time  Psychiatric:         Behavior: Behavior normal           Assessment  1   HM-up to date with breast , cervical, and colorectal cancer screening  Normal bone density; continue Ca supplement and weight bearing exercise  Immunizations   Are up-to-date including COVID vaccination  2  Esophageal reflux-  Advised treatment with an acid reducer for the next 6-8 weeks  She will use some odd edema 20 mg  Avoid aggravating foods  Elevate head of bed  If no better will refer to GI   3  Benign lesions back   4  Pain left wrist and thumb -suspect this is  CMC arthritis;   Consider de Quervain tendinitis     Especially in light of her occupation will refer to orthopedics  Avoid aggravating activities  5  Perianal lesion- this is more likely a small varicosity;  I have asked her to have her gyn check it at her upcoming checkup    Plan   Ortho referral   As above

## 2021-09-16 ENCOUNTER — TELEPHONE (OUTPATIENT)
Dept: OBGYN CLINIC | Facility: CLINIC | Age: 51
End: 2021-09-16

## 2021-09-16 NOTE — TELEPHONE ENCOUNTER
lmom for pt that we have to cx her appt for 9/21 with dr Zulema Smith he is no longer with the network and she needs to see another provider

## 2022-11-15 ENCOUNTER — APPOINTMENT (RX ONLY)
Dept: URBAN - METROPOLITAN AREA CLINIC 31 | Facility: CLINIC | Age: 52
Setting detail: DERMATOLOGY
End: 2022-11-15

## 2022-11-15 DIAGNOSIS — L81.4 OTHER MELANIN HYPERPIGMENTATION: ICD-10-CM

## 2022-11-15 DIAGNOSIS — D18.0 HEMANGIOMA: ICD-10-CM

## 2022-11-15 DIAGNOSIS — L72.0 EPIDERMAL CYST: ICD-10-CM

## 2022-11-15 DIAGNOSIS — L82.1 OTHER SEBORRHEIC KERATOSIS: ICD-10-CM

## 2022-11-15 DIAGNOSIS — L81.1 CHLOASMA: ICD-10-CM

## 2022-11-15 DIAGNOSIS — D22 MELANOCYTIC NEVI: ICD-10-CM

## 2022-11-15 PROBLEM — D18.01 HEMANGIOMA OF SKIN AND SUBCUTANEOUS TISSUE: Status: ACTIVE | Noted: 2022-11-15

## 2022-11-15 PROBLEM — D22.5 MELANOCYTIC NEVI OF TRUNK: Status: ACTIVE | Noted: 2022-11-15

## 2022-11-15 PROCEDURE — 99203 OFFICE O/P NEW LOW 30 MIN: CPT

## 2022-11-15 PROCEDURE — ? FULL BODY SKIN EXAM

## 2022-11-15 PROCEDURE — ? COUNSELING

## 2022-11-15 PROCEDURE — ? TREATMENT REGIMEN

## 2022-11-15 ASSESSMENT — LOCATION ZONE DERM
LOCATION ZONE: FACE
LOCATION ZONE: LIP
LOCATION ZONE: TRUNK

## 2022-11-15 ASSESSMENT — LOCATION SIMPLE DESCRIPTION DERM
LOCATION SIMPLE: RIGHT CHEEK
LOCATION SIMPLE: UPPER BACK
LOCATION SIMPLE: LEFT CHEEK
LOCATION SIMPLE: UPPER LIP

## 2022-11-15 ASSESSMENT — LOCATION DETAILED DESCRIPTION DERM
LOCATION DETAILED: LEFT SUPERIOR MEDIAL MALAR CHEEK
LOCATION DETAILED: PHILTRUM
LOCATION DETAILED: SUPERIOR THORACIC SPINE
LOCATION DETAILED: RIGHT INFERIOR CENTRAL MALAR CHEEK
LOCATION DETAILED: LEFT INFERIOR CENTRAL MALAR CHEEK

## 2022-11-15 NOTE — PROCEDURE: TREATMENT REGIMEN
Detail Level: Simple
Otc Regimen: I recommended an over the counter broad spectrum sunscreen with a SPF of 30 or higher such as Cerave or LaRoche. Sunscreens should be applied every 2 hours as long as sun exposure continues.
Otc Regimen: Vitamin c serum QAM\\nRetinol QHS

## 2024-06-27 ENCOUNTER — APPOINTMENT (RX ONLY)
Dept: URBAN - METROPOLITAN AREA CLINIC 31 | Facility: CLINIC | Age: 54
Setting detail: DERMATOLOGY
End: 2024-06-27

## 2024-06-27 DIAGNOSIS — D22 MELANOCYTIC NEVI: ICD-10-CM

## 2024-06-27 DIAGNOSIS — D18.0 HEMANGIOMA: ICD-10-CM

## 2024-06-27 DIAGNOSIS — L81.4 OTHER MELANIN HYPERPIGMENTATION: ICD-10-CM

## 2024-06-27 DIAGNOSIS — L82.1 OTHER SEBORRHEIC KERATOSIS: ICD-10-CM

## 2024-06-27 PROBLEM — D22.5 MELANOCYTIC NEVI OF TRUNK: Status: ACTIVE | Noted: 2024-06-27

## 2024-06-27 PROBLEM — D18.01 HEMANGIOMA OF SKIN AND SUBCUTANEOUS TISSUE: Status: ACTIVE | Noted: 2024-06-27

## 2024-06-27 PROCEDURE — ? COUNSELING

## 2024-06-27 PROCEDURE — ? TREATMENT REGIMEN

## 2024-06-27 PROCEDURE — 99213 OFFICE O/P EST LOW 20 MIN: CPT

## 2024-06-27 PROCEDURE — ? FULL BODY SKIN EXAM

## 2024-06-27 ASSESSMENT — LOCATION DETAILED DESCRIPTION DERM: LOCATION DETAILED: SUPERIOR THORACIC SPINE

## 2024-06-27 ASSESSMENT — LOCATION SIMPLE DESCRIPTION DERM: LOCATION SIMPLE: UPPER BACK

## 2024-06-27 ASSESSMENT — LOCATION ZONE DERM: LOCATION ZONE: TRUNK

## 2024-07-18 ENCOUNTER — HOSPITAL ENCOUNTER (OUTPATIENT)
Dept: HOSPITAL 99 - HWWDC | Age: 54
End: 2024-07-18
Payer: COMMERCIAL

## 2024-07-18 DIAGNOSIS — Z12.31: Primary | ICD-10-CM

## 2024-07-18 DIAGNOSIS — Z80.3: ICD-10-CM
